# Patient Record
Sex: FEMALE | RURAL
[De-identification: names, ages, dates, MRNs, and addresses within clinical notes are randomized per-mention and may not be internally consistent; named-entity substitution may affect disease eponyms.]

---

## 2019-03-12 ENCOUNTER — APPOINTMENT (RX ONLY)
Dept: RURAL CLINIC 1 | Facility: CLINIC | Age: 39
Setting detail: DERMATOLOGY
End: 2019-03-12

## 2019-03-12 DIAGNOSIS — D22 MELANOCYTIC NEVI: ICD-10-CM

## 2019-03-12 DIAGNOSIS — B85.0 PEDICULOSIS DUE TO PEDICULUS HUMANUS CAPITIS: ICD-10-CM

## 2019-03-12 PROBLEM — D22.39 MELANOCYTIC NEVI OF OTHER PARTS OF FACE: Status: ACTIVE | Noted: 2019-03-12

## 2019-03-12 PROCEDURE — 99201: CPT

## 2019-03-12 PROCEDURE — ? COUNSELING

## 2019-03-12 PROCEDURE — ? OTHER

## 2019-03-12 PROCEDURE — ? TREATMENT REGIMEN

## 2019-03-12 ASSESSMENT — LOCATION SIMPLE DESCRIPTION DERM
LOCATION SIMPLE: RIGHT FOREHEAD
LOCATION SIMPLE: POSTERIOR SCALP

## 2019-03-12 ASSESSMENT — LOCATION DETAILED DESCRIPTION DERM
LOCATION DETAILED: RIGHT SUPERIOR FOREHEAD
LOCATION DETAILED: POSTERIOR MID-PARIETAL SCALP

## 2019-03-12 ASSESSMENT — LOCATION ZONE DERM
LOCATION ZONE: FACE
LOCATION ZONE: SCALP

## 2019-03-12 NOTE — HPI: RASH
Is This A New Presentation, Or A Follow-Up?: Rash
Additional History: Shes tried many OTC treatments

## 2019-03-12 NOTE — PROCEDURE: OTHER
Detail Level: Detailed
Note Text (......Xxx Chief Complaint.): This diagnosis correlates
Other (Free Text): Nit confirmed under microscopy. Discussed with pt that her kids will also need to be diagnosed and the school informed if necessary. Pt has tried alot of treatments. Discussed with her that even after effective tx, the nits will continue to grow out as the hair does

## 2019-03-12 NOTE — PROCEDURE: TREATMENT REGIMEN
Plan: Vaseline over entire head if still persists
Detail Level: Zone
Continue Regimen: Nix/ Rid with fine toothed comb